# Patient Record
Sex: MALE | Race: WHITE
[De-identification: names, ages, dates, MRNs, and addresses within clinical notes are randomized per-mention and may not be internally consistent; named-entity substitution may affect disease eponyms.]

---

## 2019-12-18 ENCOUNTER — HOSPITAL ENCOUNTER (OUTPATIENT)
Dept: HOSPITAL 6 - PT | Age: 64
Discharge: STILL ADMITTED OR ADMINISTRATIVE | End: 2019-12-18
Attending: FAMILY MEDICINE
Payer: COMMERCIAL

## 2019-12-18 DIAGNOSIS — M25.511: Primary | ICD-10-CM

## 2022-05-24 ENCOUNTER — HOSPITAL ENCOUNTER (OUTPATIENT)
Dept: HOSPITAL 19 - SDCO | Age: 67
Discharge: HOME | End: 2022-05-24
Attending: SURGERY
Payer: COMMERCIAL

## 2022-05-24 VITALS — HEART RATE: 64 BPM | DIASTOLIC BLOOD PRESSURE: 78 MMHG | SYSTOLIC BLOOD PRESSURE: 121 MMHG

## 2022-05-24 VITALS — WEIGHT: 236.56 LBS | BODY MASS INDEX: 32.04 KG/M2 | HEIGHT: 72 IN

## 2022-05-24 VITALS — TEMPERATURE: 98.5 F | DIASTOLIC BLOOD PRESSURE: 82 MMHG | SYSTOLIC BLOOD PRESSURE: 132 MMHG | HEART RATE: 66 BPM

## 2022-05-24 VITALS — TEMPERATURE: 98.5 F | SYSTOLIC BLOOD PRESSURE: 135 MMHG | DIASTOLIC BLOOD PRESSURE: 81 MMHG | HEART RATE: 77 BPM

## 2022-05-24 VITALS — DIASTOLIC BLOOD PRESSURE: 72 MMHG | SYSTOLIC BLOOD PRESSURE: 124 MMHG | HEART RATE: 69 BPM

## 2022-05-24 DIAGNOSIS — G47.33: ICD-10-CM

## 2022-05-24 DIAGNOSIS — E80.4: ICD-10-CM

## 2022-05-24 DIAGNOSIS — K80.10: Primary | ICD-10-CM

## 2022-05-24 NOTE — NUR
Pt returned via cart to bay 5 after being in PACU.  Pt has tolerated ice chips
prior to return.  Wife remained in room and is present at this time.  VSS-see
flowsheet.  Surgical sites accessed-4 bandaides to abdomen noted to be clean,
dry and intact.  HOB elevated and pt able to scoot rear back in order to eat
and drink.  Given water and jello.  VS will be monitored and pt aware of
criteria needing to be met prior to dc home.

## 2022-05-24 NOTE — NUR
Pt also tolerated pudding.  Up to bathroom with SBA and voided without
difficulty.  IV removed, pressure dressing applied.  DC teaching completed, pt
and pts wife verbalized understanding.  Pt dressing independently with wife
present in room.

## 2022-05-24 NOTE — NUR
Pt tolerated water and jello.  Rubbing abdomen and reports pain a 3/10.  Pain
medication given per orders-see MAR.  Will continue to monitor VS and reassess
pain.

## 2023-12-27 ENCOUNTER — HOSPITAL ENCOUNTER (EMERGENCY)
Dept: HOSPITAL 6 - ED | Age: 68
Discharge: HOME | End: 2023-12-27
Payer: MEDICARE

## 2023-12-27 VITALS — WEIGHT: 220.46 LBS | HEIGHT: 70.98 IN | BODY MASS INDEX: 30.86 KG/M2

## 2023-12-27 VITALS — SYSTOLIC BLOOD PRESSURE: 137 MMHG | DIASTOLIC BLOOD PRESSURE: 80 MMHG

## 2023-12-27 DIAGNOSIS — K76.89: Primary | ICD-10-CM

## 2023-12-27 DIAGNOSIS — I28.0: ICD-10-CM

## 2023-12-27 DIAGNOSIS — R11.2: ICD-10-CM

## 2023-12-27 DIAGNOSIS — E87.6: ICD-10-CM

## 2023-12-27 DIAGNOSIS — R91.1: ICD-10-CM

## 2023-12-27 LAB
ALBUMIN SERPL-MCNC: 4 G/DL (ref 3.4–4.8)
ALT SERPL-CCNC: 43 U/L (ref 0–55)
APPEARANCE UR: CLEAR
AST SERPL-CCNC: 61 U/L (ref 5–34)
BASOPHILS # BLD: 0.04 K/MM3 (ref 0.02–0.1)
BILIRUB SERPL-MCNC: 3.4 MG/DL (ref 0.2–1.2)
BILIRUB UR QL STRIP.AUTO: NEGATIVE
CALCIUM SERPL-MCNC: 9.3 MG/DL (ref 8.3–10.5)
CO2 SERPL-SCNC: 26 MMOL/L (ref 23–31)
COLOR UR AUTO: YELLOW
EOSINOPHIL # BLD: 0.17 K/MM3 (ref 0.04–0.4)
EOSINOPHIL NFR BLD: 3.1 % (ref 0–4)
ERYTHROCYTE [DISTWIDTH] IN BLOOD BY AUTOMATED COUNT: 12.1 % (ref 11.5–14.5)
GLUCOSE SERPL-MCNC: 122 MG/DL (ref 75–110)
GLUCOSE UR QL STRIP.AUTO: NEGATIVE
HCT VFR BLD AUTO: 47.5 % (ref 42–52)
HGB BLD-MCNC: 16.8 G/DL (ref 13.5–18)
KETONES UR QL STRIP.AUTO: NEGATIVE
LEUKOCYTE ESTERASE UR QL STRIP: NEGATIVE
LIPASE SERPL-CCNC: 28 U/L (ref 8–78)
LYMPHOCYTES # BLD: 2.18 K/MM3 (ref 1.5–4)
MCH RBC QN AUTO: 32 PG (ref 27–31)
MCHC RBC AUTO-ENTMCNC: 35 G/DL (ref 33–37)
MCV RBC AUTO: 89 FL (ref 78–100)
MONOCYTES # BLD: 0.69 K/MM3 (ref 0.2–0.8)
NEUTROPHILS # BLD: 2.4 K/MM3 (ref 1.4–6.5)
NITRITE UR QL STRIP: NEGATIVE
PH UR STRIP.AUTO: 7 [PH] (ref 5–8)
PLATELET # BLD AUTO: 235 K/MM3 (ref 130–400)
PMV BLD AUTO: 8.8 FL (ref 7.4–10.4)
PROT ?TM UR-MCNC: NEGATIVE MG/DL
PROT SERPL-MCNC: 6.6 G/DL (ref 6.2–8.1)
RBC # BLD AUTO: 5.34 M/MM3 (ref 4.2–5.6)
RBC UR QL AUTO: (no result)
SODIUM SERPL-SCNC: 139 MMOL/L (ref 136–145)
SP GR UR STRIP.AUTO: 1.01 (ref 1–1.03)
TROPONIN I SERPL-MCNC: < 0.03 NG/ML (ref ?–0.03)
WBC # BLD AUTO: 5.5 K/MM3 (ref 4.8–10.8)
WBC #/AREA URNS HPF: (no result) /HPF (ref 0–3)

## 2023-12-27 PROCEDURE — C9113 INJ PANTOPRAZOLE SODIUM, VIA: HCPCS

## 2024-02-23 ENCOUNTER — HOSPITAL ENCOUNTER (OUTPATIENT)
Dept: HOSPITAL 6 - PT | Age: 69
LOS: 6 days | Discharge: HOME | End: 2024-02-29
Payer: MEDICARE

## 2024-02-23 DIAGNOSIS — M25.561: Primary | ICD-10-CM

## 2024-02-23 DIAGNOSIS — Z96.651: ICD-10-CM

## 2025-02-26 ENCOUNTER — HOSPITAL ENCOUNTER (OUTPATIENT)
Dept: HOSPITAL 6 - PT | Age: 70
LOS: 2 days | End: 2025-02-28
Attending: FAMILY MEDICINE
Payer: MEDICARE

## 2025-02-26 DIAGNOSIS — M79.606: Primary | ICD-10-CM
